# Patient Record
Sex: FEMALE | Race: WHITE | Employment: OTHER | URBAN - NONMETROPOLITAN AREA
[De-identification: names, ages, dates, MRNs, and addresses within clinical notes are randomized per-mention and may not be internally consistent; named-entity substitution may affect disease eponyms.]

---

## 2019-04-07 ENCOUNTER — OFFICE VISIT (OUTPATIENT)
Dept: PRIMARY CARE CLINIC | Age: 64
End: 2019-04-07
Payer: COMMERCIAL

## 2019-04-07 VITALS
WEIGHT: 114 LBS | SYSTOLIC BLOOD PRESSURE: 120 MMHG | TEMPERATURE: 98.5 F | DIASTOLIC BLOOD PRESSURE: 84 MMHG | OXYGEN SATURATION: 98 % | HEIGHT: 61 IN | BODY MASS INDEX: 21.52 KG/M2 | HEART RATE: 84 BPM | RESPIRATION RATE: 16 BRPM

## 2019-04-07 DIAGNOSIS — S50.851A FOREIGN BODY IN RIGHT FOREARM, INITIAL ENCOUNTER: Primary | ICD-10-CM

## 2019-04-07 PROCEDURE — 90471 IMMUNIZATION ADMIN: CPT | Performed by: PHYSICIAN ASSISTANT

## 2019-04-07 PROCEDURE — 99202 OFFICE O/P NEW SF 15 MIN: CPT | Performed by: PHYSICIAN ASSISTANT

## 2019-04-07 PROCEDURE — 90715 TDAP VACCINE 7 YRS/> IM: CPT | Performed by: PHYSICIAN ASSISTANT

## 2019-04-07 RX ORDER — IBANDRONATE SODIUM 150 MG/1
150 TABLET, FILM COATED ORAL
COMMUNITY
Start: 2019-03-04

## 2019-04-07 RX ORDER — ATORVASTATIN CALCIUM 10 MG/1
TABLET, FILM COATED ORAL
COMMUNITY
Start: 2019-02-14

## 2019-04-07 RX ORDER — SUMATRIPTAN 50 MG/1
50 TABLET, FILM COATED ORAL
COMMUNITY
Start: 2018-09-18

## 2019-04-07 ASSESSMENT — PATIENT HEALTH QUESTIONNAIRE - PHQ9
1. LITTLE INTEREST OR PLEASURE IN DOING THINGS: 0
SUM OF ALL RESPONSES TO PHQ QUESTIONS 1-9: 0
SUM OF ALL RESPONSES TO PHQ9 QUESTIONS 1 & 2: 0
2. FEELING DOWN, DEPRESSED OR HOPELESS: 0
SUM OF ALL RESPONSES TO PHQ QUESTIONS 1-9: 0

## 2019-04-07 ASSESSMENT — ENCOUNTER SYMPTOMS: EYES NEGATIVE: 1

## 2019-04-08 ENCOUNTER — TELEPHONE (OUTPATIENT)
Dept: SURGERY | Age: 64
End: 2019-04-08

## 2019-04-08 NOTE — TELEPHONE ENCOUNTER
FP called stating that Pt was seen in [facility] in [facility] on 4/7/19 for Foreign Body to Rt forearm, incident happened on 4/6/19. [facility] is referring that Pt sees General Surgery. Please advise where to schedule, next available in [place] is 5/16/19.

## 2019-04-09 ENCOUNTER — OFFICE VISIT (OUTPATIENT)
Dept: WOUND CARE | Age: 64
End: 2019-04-09
Payer: COMMERCIAL

## 2019-04-09 DIAGNOSIS — S50.851A: Primary | ICD-10-CM

## 2019-04-09 PROCEDURE — 99213 OFFICE O/P EST LOW 20 MIN: CPT | Performed by: SURGERY

## 2019-04-09 RX ORDER — CEPHALEXIN 500 MG/1
500 CAPSULE ORAL 4 TIMES DAILY
Qty: 28 CAPSULE | Refills: 0 | Status: SHIPPED | OUTPATIENT
Start: 2019-04-09 | End: 2019-04-16

## 2019-04-09 NOTE — PROGRESS NOTES
General Surgery History & Physical  Karen Hernandez MD  Pt Name: Ajay Lott  MRN: [id number]  [facility] 1955  Date of evaluation: 4/9/2019  Primary Care Physician: Jacky Ferraro    Patient evaluated at the request of Sruthi Williamson  Reason for evaluation: FB right arm      SUBJECTIVE:  Chief Complaint:   Chief Complaint   Patient presents with    Foreign Body in Skin     History of Present Illness:         Patient is a 69-year-old female who presents with a foreign body in the right forearm. She states she was gardening and a piece of old stiff Strawder grass went into her right forearm. It was superficial and she felt it go in one area and she could feel it coming near the skin about 2 cm away. The other area but didn't puncture the skin. he went to urgent care  Yesterday and they tried to remove it, but they couldn't get it out. She states it hurts about 2 out of 10. There is no sign of any redness or fever or chills. Past Medical History   has a past medical history of Dyslipidemia, Migraine, and Osteoporosis. Past Surgical History   has no past surgical history on file. Family History  family history is not on file. Social History  Tobacco use:  reports that she has been smoking cigarettes. She started smoking about 23 years ago. She has a 20.00 pack-year smoking history. She has never used smokeless tobacco.  Alcohol use:  has no alcohol history on file. Drug use:  has no drug history on file. Medications  Current Medications:   Current Outpatient Medications   Medication Sig Dispense Refill    Calcium Carb-Cholecalciferol (CALCIUM 600-D PO) Take 1 tablet by mouth      atorvastatin (LIPITOR) 10 MG tablet       ibandronate (BONIVA) 150 MG tablet Take 150 mg by mouth      SUMAtriptan (IMITREX) 50 MG tablet Take 50 mg by mouth       No current facility-administered medications for this visit.       Home Medications:   Prior to Admission medications    Medication Sig Start Date End Date Taking? Authorizing Provider   Calcium Carb-Cholecalciferol (CALCIUM 600-D PO) Take 1 tablet by mouth    Historical Provider, MD   atorvastatin (LIPITOR) 10 MG tablet  2/14/19   Historical Provider, MD   ibandronate (BONIVA) 150 MG tablet Take 150 mg by mouth 3/4/19   Historical Provider, MD   SUMAtriptan (IMITREX) 50 MG tablet Take 50 mg by mouth 9/18/18   Historical Provider, MD       Allergies  Patient has no known allergies. Review of Systems:  General: Denies any fever, chills. HEENT: Denies any diplopia, tinnitus or vertigo. Respiratory: Denies any shortness of breath or cough. Cardiac: Denies any chest pain, palpitations, claudication or edema. Gastrointestinal: Denies any melena, hematochezia, hematemesis or pyrosis. Genitourinary: Denies any frequency, urgency, hesitancy or incontinence. Hematologic: Denies bruising or bleeding easily. Endocrine: Denies any history of diabetes or thyroid disease. PHYSICAL EXAMINATION  Vitals: There were no vitals filed for this visit. General Appearance:  awake, alert, oriented, in no acute distress, well developed, well nourished and in no acute distress  Skin:  Skin color, texture, turgor normal. No rashes or lesions. Head/face:  NCAT  Eyes:  No gross abnormalities. , PERRL, Pupils- PERRL. Ears:  canals and TMs NI and External- normal  Nose/Sinuses:  Nares normal. Septum midline. Mucosa normal. No drainage or sinus tenderness. Mouth/Throat:  Mucosa moist.  No lesions. Pharynx without erythema, edema or exudate. Lungs:  Normal expansion. Clear to auscultation. No rales, rhonchi, or wheezing., Breathing Pattern: regular, no distress, Breath sounds: normal  Heart:  Heart sounds are normal.  Regular rate and rhythm without murmur, gallop or rub. Auscultation: Normal S1 and S2.  Regular rhythm. No murmurs, gallops, or rubs. Abdomen:  Soft, non-tender, normal bowel sounds. No bruits, organomegaly or masses.   Musculoskeletal:  negative, negative findings: no erythema, induration, or nodules, ROM of all joints is normal, strength normal  Neurologic:  negative findings: proximal muscle strength normal, speech normal, mental status intact, cranial nerves 2-12 intact, muscle tone normal, muscle strength normal  Right forearm: There is a small entrance wound and about 2 cm further a exit wound. I cannot feel the piece of straw. There is a little bit of ecchymosis. There is no sign of any cellulitis or abscess. LABS:  CBC No results found for: WBC, HGB, HCT, PLT  BMP No results found for: NA, K, CL, CO2, BUN, CREATININE, PHOS, MG  LFT's: No results found for: AST, ALT, ALKPHOS, BILITOT, BILIDIR, AMYLASE, LIPASE, LACTATE  COAGS: No results found for: INR, PTT  Lipids: No results found for: CHOL, HDL, LDLCHOLESTEROL, CHOLHDLRATIO, TRIG, VLDL    RADIOLOGY:  All images reviewed and within normal limits unless otherwise specified: Yes    IMPRESSIONS:   Diagnosis Orders   1. Acute foreign body of right forearm, initial encounter       Surgical Risk: low to moderate risk    PLAN:  1. At this point. There is no sign of infection. I think it would be difficult to try to get this out. It will probably fall apart and break in the many pieces. We would also have to open the incision. She is leaving town tomorrow for [place]. At this point, I think it's reasonable to leave it in and let the body try to encapsulated and/or spit it out. We will put her on Keflex for the next week. I will see her back in one week. If he gets encapsulated or becomes indurated, we may be able to explore and remove it. If he gets infected. We will take her to the OR and explored and wash out the area. We will give her her our number and she can call us in [place]. If she has any problems. Medical Decision Making: low complexity    Thank you for the interesting evaluation. Further recommendations to follow.     Hiro Acosta MD  Electronically signed 4/9/2019 at 8:32 AM